# Patient Record
(demographics unavailable — no encounter records)

---

## 2025-04-09 NOTE — HISTORY OF PRESENT ILLNESS
[N] : Patient does not use contraception [Y] : Positive pregnancy history [No] : Patient does not have concerns regarding sex [Currently Active] : currently active [FreeTextEntry1] : 23 y/o presented with irregular periods starting Feb has had 2 periods in Feb and then in March lasting 4 days each time. Denies pelvic pain or abnormal discharge  hx top in 2024 due to severe debilitating hyperemesis will do hormone testing  sono today  [PapSmeardate] : 8/2/24 [HIVDate] : 8/7/24 [SyphilisDate] : 8/7/24 [GonorrheaDate] : 8/2/24 [ChlamydiaDate] : 8/2/24 [HepatitisBDate] : 8/7/24 [HepatitisCDate] : 8/7/24 [LMPDate] : 3/24/25 [MensesFreq] : 29 [MensesLength] : 3-4 [PGHxTotal] : 2 [PGHxAbortions] : 2 [[facility] Center[facility] : 0 [TextBox_9] : 13 [TextBox_13] : 29 [TextBox_15] : 3-4

## 2025-04-09 NOTE — REASON FOR VISIT
[Acute] : an acute visit for [FreeTextEntry2] : follow up after TOP. Pt states menses came 2x in Feb and March.

## 2025-04-09 NOTE — PHYSICAL EXAM
[Labia Majora] : normal [Labia Minora] : normal [Normal] : normal [Uterine Adnexae] : non-palpable [Tenderness] : nontender [Enlarged ___ wks] : not enlarged [Mass ___ cm] : no uterine mass was palpated